# Patient Record
Sex: FEMALE | Race: WHITE
[De-identification: names, ages, dates, MRNs, and addresses within clinical notes are randomized per-mention and may not be internally consistent; named-entity substitution may affect disease eponyms.]

---

## 2019-04-27 ENCOUNTER — HOSPITAL ENCOUNTER (EMERGENCY)
Dept: HOSPITAL 74 - FER | Age: 54
Discharge: HOME | End: 2019-04-27
Payer: COMMERCIAL

## 2019-04-27 VITALS — HEART RATE: 103 BPM | SYSTOLIC BLOOD PRESSURE: 163 MMHG | TEMPERATURE: 98 F | DIASTOLIC BLOOD PRESSURE: 96 MMHG

## 2019-04-27 VITALS — BODY MASS INDEX: 23.7 KG/M2
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
## 2019-04-27 DIAGNOSIS — T78.40XA: Primary | ICD-10-CM

## 2019-04-27 NOTE — PDOC
History of Present Illness





- General


Chief Complaint: Rash


Stated Complaint: FACIAL RASH


Time Seen by Provider: 04/27/19 23:38


History Source: Patient


Exam Limitations: No Limitations





- History of Present Illness


Initial Comments: 





04/28/19 01:33


This is a 53-year-old female who comes in complaining of an ALLERGIC reaction 

to her face. Patient said her lip was swollen and she came in because she got 

concerned that we get worse. Patient denies any cough, congestion, shortness of 

breath, nausea, abdominal pain. Patient is uncertain what she is reacting to. 

Patient denies any new lotion screams ointments soaps detergents in her 

environment or new food in her diet. Patient took 2 Zyrtec prior to coming in 

and said symptoms are much better now





Allergies: as per nursing notes


Past Medical History: none


Social history: Lives with family. No smoking. No alcohol. No illicit drugs.


Surgical history: None





General:  No fevers or chills, no weakness, no weight loss 


HEENT: No change in vision.  No sore throat,. No ear pain


CardioVascular: no chest discomfort. No shortness of breath


Respiratory:No cough, or wheezing. 


Gastrointestinal:  no nausea, vomiting, diarrhea or constipation,  No rectal 

bleeding


Genitourinary:  No dysuria, hematuria, or frequency


Musculoskeletal:  No joint or muscle pain or swelling


Neurologic: No headache, vertigo, dizziness or loss of consciousness


Psychiatric: nor depression 


Skin: No rashes or easy bruising


Endocrine: no increased thirst or abnormal weight change


Allergic: no skin or latex allergy


All other systems reviewed and normal








GENERAL: The patient is awake, alert, and fully 


oriented, in no acute distress.


HEAD: Normal with no signs of trauma.


EYES: Pupils equal, round and reactive to light, extraocular movements intact, 

sclera anicteric, conjunctiva clear.


THROAT: There is no angioedema of the oropharynx, there is some mild edema of 

the perioral oral area. Otherwise no rashes or swelling. There is some mild 

erythema to the periorbital roll area


EXTREMITIES:atraumatic, Normal range of motion, no edema.


NEUROLOGICAL: Normal speech, normal gait.


PSYCH: Normal mood, normal affect.


SKIN: Warm, Dry, normal turgor, no rashes or lesions noted.





Patient given prednisone and refused the Medrol Dosepak taper. Patient 

discharged home told to take Zyrtec every 4 hours as needed and follow up with 

her primary care doctor.





Past History





- Past Medical History


Allergies/Adverse Reactions: 


 Allergies











Allergy/AdvReac Type Severity Reaction Status Date / Time


 


No Known Allergies Allergy   Verified 05/02/13 12:53











Home Medications: 


Ambulatory Orders





No Home Medications 0 dose .ROUTE UTDICT 05/02/13 








COPD: No





- Suicide/Smoking/Psychosocial Hx


Smoking Status: Yes


Smoking History: Never smoked


Number of Cigarettes Smoked Daily: 5





*Physical Exam





- Vital Signs


 Last Vital Signs











Temp Pulse Resp BP Pulse Ox


 


 98 F   103 H  18   163/96   100 


 


 04/27/19 23:25  04/27/19 23:25  04/27/19 23:25  04/27/19 23:25  04/27/19 23:25














*DC/Admit/Observation/Transfer


Diagnosis at time of Disposition: 


Allergic reaction


Qualifiers:


 Encounter type: initial encounter Qualified Code(s): T78.40XA - Allergy, 

unspecified, initial encounter








- Discharge Dispostion


Disposition: HOME


Condition at time of disposition: Stable





- Referrals





- Patient Instructions


Additional Instructions: 


Take Benadryl one tablet as often as every 4-6 hours if needed for symptoms.





Return to the emergency department immediately with ANY new, persistent or 

worsening symptoms.





Continue any medications as previously prescribed by your physician.





You should follow up with your primary doctor as soon as possible regarding 

today's emergency department visit.


.


Please make sure your doctor reviews the results of your emergency evaluation.





Thank you for coming to the   Emergency Department today for your care. It was 

a pleasure to see you today. Please note that your evaluation is INCOMPLETE 

until you  follow-up with your doctor. 





- Post Discharge Activity

## 2019-06-24 ENCOUNTER — TRANSCRIPTION ENCOUNTER (OUTPATIENT)
Age: 54
End: 2019-06-24

## 2020-08-25 ENCOUNTER — TRANSCRIPTION ENCOUNTER (OUTPATIENT)
Age: 55
End: 2020-08-25

## 2021-11-04 PROBLEM — Z00.00 ENCOUNTER FOR PREVENTIVE HEALTH EXAMINATION: Status: ACTIVE | Noted: 2021-11-04

## 2021-11-08 ENCOUNTER — APPOINTMENT (OUTPATIENT)
Dept: THORACIC SURGERY | Facility: CLINIC | Age: 56
End: 2021-11-08

## 2021-11-08 ENCOUNTER — NON-APPOINTMENT (OUTPATIENT)
Age: 56
End: 2021-11-08

## 2021-11-30 ENCOUNTER — TRANSCRIPTION ENCOUNTER (OUTPATIENT)
Age: 56
End: 2021-11-30

## 2024-08-25 ENCOUNTER — NON-APPOINTMENT (OUTPATIENT)
Age: 59
End: 2024-08-25

## 2025-03-03 ENCOUNTER — APPOINTMENT (OUTPATIENT)
Dept: THORACIC SURGERY | Facility: CLINIC | Age: 60
End: 2025-03-03
Payer: COMMERCIAL

## 2025-03-03 VITALS — BODY MASS INDEX: 24.55 KG/M2 | WEIGHT: 130 LBS | HEIGHT: 61 IN

## 2025-03-03 DIAGNOSIS — F17.210 NICOTINE DEPENDENCE, CIGARETTES, UNCOMPLICATED: ICD-10-CM

## 2025-03-03 PROCEDURE — G0296 VISIT TO DETERM LDCT ELIG: CPT

## 2025-03-11 ENCOUNTER — TRANSCRIPTION ENCOUNTER (OUTPATIENT)
Age: 60
End: 2025-03-11